# Patient Record
Sex: FEMALE | Race: BLACK OR AFRICAN AMERICAN | NOT HISPANIC OR LATINO | URBAN - METROPOLITAN AREA
[De-identification: names, ages, dates, MRNs, and addresses within clinical notes are randomized per-mention and may not be internally consistent; named-entity substitution may affect disease eponyms.]

---

## 2024-03-28 ENCOUNTER — HOSPITAL ENCOUNTER (EMERGENCY)
Facility: HOSPITAL | Age: 24
Discharge: HOME | End: 2024-03-28
Attending: STUDENT IN AN ORGANIZED HEALTH CARE EDUCATION/TRAINING PROGRAM
Payer: COMMERCIAL

## 2024-03-28 VITALS
DIASTOLIC BLOOD PRESSURE: 72 MMHG | HEART RATE: 64 BPM | TEMPERATURE: 98.1 F | SYSTOLIC BLOOD PRESSURE: 118 MMHG | RESPIRATION RATE: 16 BRPM | OXYGEN SATURATION: 99 %

## 2024-03-28 DIAGNOSIS — K08.89 PAIN, DENTAL: Primary | ICD-10-CM

## 2024-03-28 PROCEDURE — 99283 EMERGENCY DEPT VISIT LOW MDM: CPT

## 2024-03-28 PROCEDURE — 99284 EMERGENCY DEPT VISIT MOD MDM: CPT | Mod: 25

## 2024-03-28 PROCEDURE — 64400 NJX AA&/STRD TRIGEMINAL NRV: CPT

## 2024-03-28 RX ORDER — LIDOCAINE HYDROCHLORIDE 10 MG/ML
25 INJECTION INFILTRATION; PERINEURAL ONCE
Status: DISCONTINUED | OUTPATIENT
Start: 2024-03-28 | End: 2024-03-28 | Stop reason: HOSPADM

## 2024-03-28 RX ORDER — IBUPROFEN 600 MG/1
600 TABLET ORAL EVERY 6 HOURS PRN
Qty: 28 TABLET | Refills: 0 | Status: SHIPPED | OUTPATIENT
Start: 2024-03-28 | End: 2024-04-04

## 2024-03-28 RX ORDER — PENICILLIN V POTASSIUM 500 MG/1
500 TABLET, FILM COATED ORAL 4 TIMES DAILY
Qty: 28 TABLET | Refills: 0 | Status: SHIPPED | OUTPATIENT
Start: 2024-03-28 | End: 2024-04-04

## 2024-03-28 RX ORDER — ACETAMINOPHEN 500 MG
1000 TABLET ORAL EVERY 6 HOURS PRN
Qty: 28 TABLET | Refills: 0 | Status: SHIPPED | OUTPATIENT
Start: 2024-03-28 | End: 2024-04-04

## 2024-03-28 ASSESSMENT — PAIN DESCRIPTION - PAIN TYPE: TYPE: ACUTE PAIN

## 2024-03-28 ASSESSMENT — COLUMBIA-SUICIDE SEVERITY RATING SCALE - C-SSRS
2. HAVE YOU ACTUALLY HAD ANY THOUGHTS OF KILLING YOURSELF?: NO
6. HAVE YOU EVER DONE ANYTHING, STARTED TO DO ANYTHING, OR PREPARED TO DO ANYTHING TO END YOUR LIFE?: NO
1. IN THE PAST MONTH, HAVE YOU WISHED YOU WERE DEAD OR WISHED YOU COULD GO TO SLEEP AND NOT WAKE UP?: NO

## 2024-03-28 ASSESSMENT — PAIN - FUNCTIONAL ASSESSMENT: PAIN_FUNCTIONAL_ASSESSMENT: 0-10

## 2024-03-28 ASSESSMENT — PAIN DESCRIPTION - ORIENTATION: ORIENTATION: LEFT

## 2024-03-28 ASSESSMENT — PAIN DESCRIPTION - LOCATION: LOCATION: TEETH

## 2024-03-28 ASSESSMENT — PAIN DESCRIPTION - DESCRIPTORS: DESCRIPTORS: ACHING

## 2024-03-28 ASSESSMENT — LIFESTYLE VARIABLES
HAVE YOU EVER FELT YOU SHOULD CUT DOWN ON YOUR DRINKING: NO
EVER FELT BAD OR GUILTY ABOUT YOUR DRINKING: NO
TOTAL SCORE: 0
EVER HAD A DRINK FIRST THING IN THE MORNING TO STEADY YOUR NERVES TO GET RID OF A HANGOVER: NO
HAVE PEOPLE ANNOYED YOU BY CRITICIZING YOUR DRINKING: NO

## 2024-03-28 ASSESSMENT — PAIN SCALES - GENERAL: PAINLEVEL_OUTOF10: 9

## 2024-03-28 NOTE — ED PROCEDURE NOTE
Procedure  Dental Block    Performed by: Anastasia Willis PA-C  Authorized by: Johnson Davila MD    Consent:     Consent obtained:  Verbal    Consent given by:  Patient    Risks, benefits, and alternatives were discussed: yes      Risks discussed:  Infection, nerve damage, swelling, unsuccessful block, pain, intravascular injection and hematoma    Alternatives discussed:  No treatment and delayed treatment  Universal protocol:     Procedure explained and questions answered to patient or proxy's satisfaction: yes      Patient identity confirmed:  Verbally with patient  Indications:     Indications: dental pain    Location:     Block type:  Inferior alveolar    Laterality:  Left  Procedure details:     Needle gauge:  27 G    Anesthetic injected:  Lidocaine 1% w/o epi    Injection procedure:  Anatomic landmarks identified, introduced needle, incremental injection, negative aspiration for blood and anatomic landmarks palpated  Post-procedure details:     Outcome:  Pain relieved    Procedure completion:  Tolerated well, no immediate complications             Anastasia Willis PA-C  03/28/24 4740

## 2024-03-28 NOTE — ED PROCEDURE NOTE
Procedure  Dental Block    Performed by: Anastasia Willis PA-C  Authorized by: Johnson Davila MD    Consent:     Consent obtained:  Verbal    Consent given by:  Patient    Risks, benefits, and alternatives were discussed: yes      Risks discussed:  Infection, nerve damage, swelling, unsuccessful block, pain and intravascular injection    Alternatives discussed:  No treatment and delayed treatment  Universal protocol:     Procedure explained and questions answered to patient or proxy's satisfaction: yes      Patient identity confirmed:  Verbally with patient  Indications:     Indications: dental pain    Location:     Block type:  Anterior superior alveolar    Laterality:  Left  Procedure details:     Needle gauge:  27 G    Anesthetic injected:  Lidocaine 1% w/o epi    Injection procedure:  Anatomic landmarks identified, introduced needle, incremental injection, negative aspiration for blood and anatomic landmarks palpated  Post-procedure details:     Outcome:  Pain relieved    Procedure completion:  Tolerated well, no immediate complications             Anastasia Willis PA-C  03/28/24 1920

## 2024-03-28 NOTE — ED PROVIDER NOTES
HPI   Chief Complaint   Patient presents with    Dental Pain   This is a 23-year-old female who denies any significant past medical history who presents to the ED with dental pain for 3 days.  Patient states that she has had multiple broken teeth on the right and left sides for months now.  She started to experience aching pains in her left upper and lower back teeth a few days ago, that is worse when she chews. Denies any difficulty breathing or swallowing. She has not taken anything at home for her symptoms.    Denies any fevers, chills, sore throat, dysphagia, chest pain, shortness of breath, abdominal pain, N/V/D.     Patient History   No past medical history on file.  No past surgical history on file.  No family history on file.  Social History     Tobacco Use    Smoking status: Not on file    Smokeless tobacco: Not on file   Substance Use Topics    Alcohol use: Not on file    Drug use: Not on file       Physical Exam   ED Triage Vitals [03/28/24 1535]   Temperature Heart Rate Respirations BP   36.7 °C (98.1 °F) 76 16 120/68      Pulse Ox Temp Source Heart Rate Source Patient Position   100 % Temporal -- --      BP Location FiO2 (%)     -- --       Physical Exam  Constitutional:       General: She is not in acute distress.     Appearance: She is not ill-appearing or diaphoretic.   HENT:      Head: Normocephalic and atraumatic.      Mouth/Throat:      Mouth: Mucous membranes are moist.      Pharynx: Oropharynx is clear. No oropharyngeal exudate or posterior oropharyngeal erythema.      Comments: Broken right upper and lower molars, broken left upper molars.  Multiple filled dental caries.  General poor dentition. Slight swelling to the left upper gumline.  No areas of gross erythema or fluctuance.  Cardiovascular:      Rate and Rhythm: Normal rate and regular rhythm.      Heart sounds: Normal heart sounds.   Pulmonary:      Effort: Pulmonary effort is normal. No respiratory distress.      Breath sounds: No  wheezing, rhonchi or rales.   Abdominal:      General: Bowel sounds are normal.      Palpations: Abdomen is soft.      Tenderness: There is no abdominal tenderness.   Musculoskeletal:         General: Normal range of motion.   Skin:     General: Skin is warm and dry.   Neurological:      Mental Status: She is alert and oriented to person, place, and time.         ED Course & MDM   Diagnoses as of 03/28/24 1826   Pain, dental       Medical Decision Making  This is a 23-year-old female who denies any significant past medical history who presents to the ED with dental pain.  Differentials considered but not limited to: Dental abscess, broken teeth, peritonsillar abscess  Physical exam there were multiple filled dental caries with overall general poor dentition. Broken right upper and lower molars with a broken left upper molar. Slight swelling to the left upper gumline with no areas of erythema or fluctuance. Remainder of physical exam was benign.  Vitals are stable.  On physical exam there were no areas concerning for an abscess.  There were multiple broken molars on the right and left side.  Patient was sitting up and able to manage her own secretions and speak and on muffled voice.  Uvula was midline.  There was no oropharyngeal exudate or posterior oropharyngeal erythema.  Low suspicion for peritonsillar abscess.  Performed a dental block for pain control.  Patient tolerated the procedure well.  Following the procedure, patient states she feels much better and that her pain has resolved.  Counseled her that she must follow-up with a dentist.  Provided resources for local dental care.  She was given a prescription for penicillin to cover for possible infection.  Also gave her prescription for Tylenol and Motrin for pain.  She was instructed to return to the ED if she experiences any difficulty swallowing, breathing or any other concerning symptoms.  Patient verbalized understanding and was agreeable with plan of  care.  Discharged in stable condition.           Anastasia Willis PA-C  03/28/24 1925